# Patient Record
Sex: FEMALE | Race: WHITE | NOT HISPANIC OR LATINO | URBAN - METROPOLITAN AREA
[De-identification: names, ages, dates, MRNs, and addresses within clinical notes are randomized per-mention and may not be internally consistent; named-entity substitution may affect disease eponyms.]

---

## 2017-01-30 ENCOUNTER — OFFICE VISIT - HEALTHEAST (OUTPATIENT)
Dept: INTERNAL MEDICINE | Facility: CLINIC | Age: 22
End: 2017-01-30

## 2017-01-30 DIAGNOSIS — Z00.00 ANNUAL PHYSICAL EXAM: ICD-10-CM

## 2017-01-30 DIAGNOSIS — Z00.00 HEALTHCARE MAINTENANCE: ICD-10-CM

## 2017-01-30 DIAGNOSIS — H93.8X3 EAR CONGESTION, BILATERAL: ICD-10-CM

## 2017-01-30 RX ORDER — TRIAMCINOLONE ACETONIDE 1 MG/G
1 OINTMENT TOPICAL PRN
Status: SHIPPED | COMMUNITY
Start: 2017-01-30

## 2017-01-30 ASSESSMENT — MIFFLIN-ST. JEOR: SCORE: 1337.26

## 2017-03-06 ENCOUNTER — OFFICE VISIT - HEALTHEAST (OUTPATIENT)
Dept: INTERNAL MEDICINE | Facility: CLINIC | Age: 22
End: 2017-03-06

## 2017-03-06 ENCOUNTER — COMMUNICATION - HEALTHEAST (OUTPATIENT)
Dept: INTERNAL MEDICINE | Facility: CLINIC | Age: 22
End: 2017-03-06

## 2017-03-06 ENCOUNTER — COMMUNICATION - HEALTHEAST (OUTPATIENT)
Dept: SCHEDULING | Facility: CLINIC | Age: 22
End: 2017-03-06

## 2017-03-06 DIAGNOSIS — H69.90 EUSTACHIAN TUBE DYSFUNCTION: ICD-10-CM

## 2017-03-06 ASSESSMENT — MIFFLIN-ST. JEOR: SCORE: 1333.97

## 2017-04-18 ENCOUNTER — COMMUNICATION - HEALTHEAST (OUTPATIENT)
Dept: SCHEDULING | Facility: CLINIC | Age: 22
End: 2017-04-18

## 2017-04-18 DIAGNOSIS — H93.8X3 EAR CONGESTION, BILATERAL: ICD-10-CM

## 2017-05-01 ENCOUNTER — RECORDS - HEALTHEAST (OUTPATIENT)
Dept: ADMINISTRATIVE | Facility: OTHER | Age: 22
End: 2017-05-01

## 2017-07-13 ENCOUNTER — COMMUNICATION - HEALTHEAST (OUTPATIENT)
Dept: INTERNAL MEDICINE | Facility: CLINIC | Age: 22
End: 2017-07-13

## 2017-12-26 ENCOUNTER — COMMUNICATION - HEALTHEAST (OUTPATIENT)
Dept: INTERNAL MEDICINE | Facility: CLINIC | Age: 22
End: 2017-12-26

## 2017-12-26 RX ORDER — NORGESTIMATE AND ETHINYL ESTRADIOL 0.25-0.035
KIT ORAL
Qty: 84 TABLET | Refills: 0 | Status: SHIPPED | OUTPATIENT
Start: 2017-12-26

## 2021-05-30 VITALS — HEIGHT: 65 IN | BODY MASS INDEX: 22.01 KG/M2 | WEIGHT: 132.13 LBS

## 2021-05-30 VITALS — WEIGHT: 131.4 LBS | HEIGHT: 65 IN | BODY MASS INDEX: 21.89 KG/M2

## 2021-06-08 NOTE — PROGRESS NOTES
"[facility] Follow Up Note    Assessment/Plan:    1. Annual physical exam  Patient is up-to-date on everything.  No STD screening today because she is not sexually active.  In the past she completed 2 hepatitis B vaccinations due to travel history.  Will give her her third and final hepatitis B vaccine today.    - Hepatitis B vaccine age 11 years and above IM    2. Contraception  She did well on Ortho Tri-Cyclen but it's not covered by insurance.  Will try Ortho-Cyclen.  - norgestimate-ethinyl estradiol (ORTHO-CYCLEN) 0.25-35 mg-mcg per tablet; Take 1 tablet by mouth daily.  Dispense: 28 tablet; Refill: 11    3.  Ear congestion  Most likely secondary to eustachian tube dysfunction.  Prescription for Flonase was provided.    Valencia Jay MD    Chief Complaint:  Chief Complaint   Patient presents with     [facility]     Annual Exam     recent tx for ear inf - ears still bothersome       History of Present Illness:  Angie is a 21 y.o. female with history of mild intermittent IBS, currently here to establish care and have several concerns addressed.    Patient developed some ear congestion and discomfort last week and was seen at urgent care.  She was given Z-Glen for 3 days.  She denies any symptoms of sore throat and nasal congestion at the time of the symptoms.  Initially she took some ibuprofen but currently stopped.  Currently she feels like \"underwater but denies any fevers chills or sharp pains in her ear.  She has not been submerging her head in water.  On exam I do not appreciate any inflammation.  Discussed she does not have infection but possible eustachian tube dysfunction.  She will try Flonase.    She is also interested in continuing her birth control.  Initially it was started several years ago due to heavy menstruation and cramping.  She never had any problems with having regular menstruations.  Currently she is Ortho Tri-Cyclen but insurance is not covering this medication " "anymore.  She was given another birth control but her cramps and menorrhagia came back on that.  Discussed that we will try Ortho Tri-Cyclen.  If she is not satisfied or if it's too expensive she will let me know and we'll try different birth control.  Patient is currently not sexually active.  She completed HPV vaccinations.    Review of Systems:  A comprehensive review of systems was performed and was otherwise negative    No depression anxiety.  Sleeps well.  Energy level is good.  No asthma, no shortness of breath or cough, she exercises by biking and jogging and running.  No chest pains palpitations or dizziness.  Intermittently she does get diarrhea and abdominal cramping due to increased stress but it does not happen very frequently.  Her weight has been stable.  No urinary problems    PFSH:  Social History: Reviewed  History   Smoking Status     Never Smoker   Smokeless Tobacco     Never Used     Social History     Social History Narrative    [place], she is currently attending [email or web address] and studying accounting and photography.  She lives his roommates.  She mom lives in the area.       Past History: Reviewed  Current Outpatient Prescriptions   Medication Sig Dispense Refill     norgestimate-ethinyl estradiol (ORTHO-CYCLEN) 0.25-35 mg-mcg per tablet Take 1 tablet by mouth daily. 28 tablet 11     triamcinolone (KENALOG) 0.1 % ointment Apply 1 application topically as needed.       No current facility-administered medications for this visit.        Family History: Reviewed    Physical Exam:    Vitals:    01/30/17 1505   BP: 104/68   Patient Site: Left Arm   Patient Position: Sitting   Cuff Size: Adult Regular   Pulse: 76   Resp: 16   SpO2: 98%   Weight: 132 lb 2 oz (59.9 kg)   Height: 5' 4.5\" (1.638 m)     Wt Readings from Last 3 Encounters:   01/30/17 132 lb 2 oz (59.9 kg)     Body mass index is 22.33 kg/(m^2).    Constitutional:  Reveals a pleasant young female.  " Vitals:  Per nursing notes.  HEENT:No cervical LAD, no thyromegaly,  conjunctiva is pink, no scleral icterus, TMs are visualized and normal bl, oropharynx is clear, no exudates,   Cardiac:  Regular rate and rhythm,no murmurs, rubs, or gallops. Legs without edema. Palpation of the radial pulse regular.  Lungs: Clear to auscultation bl.  Respiratory effort normal.  Abdomen:positive BS, soft, nontender, nondistended.  No hepato-splenomagaly  Skin:   Without rash, bruise, or palpable lesions.  Few crackles but no suspicious moles  Rheumatologic: Normal joints and nails of the hands.    Psychiatric: affect appropriate, memory intact.   Breast exam: normal    Data Review:    Analysis and Summary of Old Records (2): Yes     Records Requested (1): No      Other History Summarized (from other people in the room) (2): No    Radiology Tests Summarized (XRAY/CT/MRI/DXA) (1): No    Labs Reviewed (1): Yes    Medicine Tests Reviewed (EKG/ECHO/COLONOSCOPY/EGD) (1): No    Independent Review of EKG or X-RAY (2): No